# Patient Record
Sex: MALE | Race: WHITE | NOT HISPANIC OR LATINO | URBAN - METROPOLITAN AREA
[De-identification: names, ages, dates, MRNs, and addresses within clinical notes are randomized per-mention and may not be internally consistent; named-entity substitution may affect disease eponyms.]

---
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
## 2021-06-01 NOTE — ED PEDIATRIC NURSE NOTE - CAS EDN DISCHARGE ASSESSMENT
I personally performed the service described in the documentation recorded by the scribe in my presence, and it accurately and completely records my words and actions. Alert and oriented to person, place and time/Patient baseline mental status/Awake